# Patient Record
Sex: MALE | Race: WHITE | NOT HISPANIC OR LATINO | Employment: FULL TIME | URBAN - METROPOLITAN AREA
[De-identification: names, ages, dates, MRNs, and addresses within clinical notes are randomized per-mention and may not be internally consistent; named-entity substitution may affect disease eponyms.]

---

## 2019-10-21 ENCOUNTER — OFFICE VISIT (OUTPATIENT)
Dept: FAMILY MEDICINE CLINIC | Facility: CLINIC | Age: 39
End: 2019-10-21

## 2019-10-21 VITALS
OXYGEN SATURATION: 98 % | BODY MASS INDEX: 40.77 KG/M2 | HEART RATE: 97 BPM | SYSTOLIC BLOOD PRESSURE: 120 MMHG | DIASTOLIC BLOOD PRESSURE: 76 MMHG | TEMPERATURE: 98.1 F | HEIGHT: 65 IN | RESPIRATION RATE: 16 BRPM | WEIGHT: 244.7 LBS

## 2019-10-21 DIAGNOSIS — Z00.00 ANNUAL PHYSICAL EXAM: Primary | ICD-10-CM

## 2019-10-21 DIAGNOSIS — K57.92 DIVERTICULITIS: ICD-10-CM

## 2019-10-21 LAB
ALBUMIN SERPL-MCNC: 4.9 G/DL (ref 3.5–5.2)
ALBUMIN/GLOB SERPL: 2.3 G/DL
ALP SERPL-CCNC: 68 U/L (ref 39–117)
ALT SERPL-CCNC: 34 U/L (ref 1–41)
AST SERPL-CCNC: 16 U/L (ref 1–40)
BASOPHILS # BLD AUTO: 0.04 10*3/MM3 (ref 0–0.2)
BASOPHILS NFR BLD AUTO: 0.8 % (ref 0–1.5)
BILIRUB SERPL-MCNC: 0.4 MG/DL (ref 0.2–1.2)
BUN SERPL-MCNC: 8 MG/DL (ref 6–20)
BUN/CREAT SERPL: 8.2 (ref 7–25)
CALCIUM SERPL-MCNC: 9.9 MG/DL (ref 8.6–10.5)
CHLORIDE SERPL-SCNC: 100 MMOL/L (ref 98–107)
CHOLEST SERPL-MCNC: 188 MG/DL (ref 0–200)
CO2 SERPL-SCNC: 28.4 MMOL/L (ref 22–29)
CREAT SERPL-MCNC: 0.98 MG/DL (ref 0.76–1.27)
EOSINOPHIL # BLD AUTO: 0.1 10*3/MM3 (ref 0–0.4)
EOSINOPHIL NFR BLD AUTO: 2 % (ref 0.3–6.2)
ERYTHROCYTE [DISTWIDTH] IN BLOOD BY AUTOMATED COUNT: 12.4 % (ref 12.3–15.4)
GLOBULIN SER CALC-MCNC: 2.1 GM/DL
GLUCOSE SERPL-MCNC: 98 MG/DL (ref 65–99)
HCT VFR BLD AUTO: 46.1 % (ref 37.5–51)
HDLC SERPL-MCNC: 47 MG/DL (ref 40–60)
HGB BLD-MCNC: 15.9 G/DL (ref 13–17.7)
IMM GRANULOCYTES # BLD AUTO: 0.01 10*3/MM3 (ref 0–0.05)
IMM GRANULOCYTES NFR BLD AUTO: 0.2 % (ref 0–0.5)
LDLC SERPL CALC-MCNC: 117 MG/DL (ref 0–100)
LYMPHOCYTES # BLD AUTO: 1.34 10*3/MM3 (ref 0.7–3.1)
LYMPHOCYTES NFR BLD AUTO: 26.6 % (ref 19.6–45.3)
MCH RBC QN AUTO: 32.3 PG (ref 26.6–33)
MCHC RBC AUTO-ENTMCNC: 34.5 G/DL (ref 31.5–35.7)
MCV RBC AUTO: 93.7 FL (ref 79–97)
MONOCYTES # BLD AUTO: 0.42 10*3/MM3 (ref 0.1–0.9)
MONOCYTES NFR BLD AUTO: 8.3 % (ref 5–12)
NEUTROPHILS # BLD AUTO: 3.12 10*3/MM3 (ref 1.7–7)
NEUTROPHILS NFR BLD AUTO: 62.1 % (ref 42.7–76)
NRBC BLD AUTO-RTO: 0 /100 WBC (ref 0–0.2)
PLATELET # BLD AUTO: 259 10*3/MM3 (ref 140–450)
POTASSIUM SERPL-SCNC: 4.7 MMOL/L (ref 3.5–5.2)
PROT SERPL-MCNC: 7 G/DL (ref 6–8.5)
RBC # BLD AUTO: 4.92 10*6/MM3 (ref 4.14–5.8)
SODIUM SERPL-SCNC: 143 MMOL/L (ref 136–145)
TRIGL SERPL-MCNC: 120 MG/DL (ref 0–150)
VLDLC SERPL CALC-MCNC: 24 MG/DL
WBC # BLD AUTO: 5.03 10*3/MM3 (ref 3.4–10.8)

## 2019-10-21 PROCEDURE — 99203 OFFICE O/P NEW LOW 30 MIN: CPT | Performed by: FAMILY MEDICINE

## 2019-10-21 NOTE — PATIENT INSTRUCTIONS
Lab work ordered, will call with results  Complete antibiotics as directed  Recommend increasing fiber in diet as discussed  Return to clinic as needed and for routine annual exam

## 2019-10-21 NOTE — PROGRESS NOTES
Subjective   Roge Mustafa is a 39 y.o. male.     Chief Complaint   Patient presents with   • Abdominal Pain     follow up [facility] [place] abdominal [place]    • Establish Care   • Annual Exam     cpe       History of Present Illness   Patient presents for check up after diagnosis of diverticulitis Thursday (four days ago) when he went to Urgent care. He has been taking Augmentin and has improved greatly. This is his first episode. The LLQ pain was related to movement and coughing, worse when passing gas- first began Wednesday around 2pm. His BM's remained regular until yesterday when he reintroduced solid foods he had some diarrhea, described as small amount of loose stool and one episode this morning. The pain is currently 1-2/10, improved from 6-7/10 at its worse.   He has not been to the doctor in 10 years.  Denies fevers, unexpected weight loss, change in appetite, blood in stool. No FH of colon cancer or IBD.  He is  of a company, has some stress at work.  He admits to not eating many fruits or vegetables regularly. He eats a lot of cheese in his diet. Breakfast burrito for breakfast, pimento cheese sandwich for lunch, and whatever his wife cooks for dinner, in a typical day.    FH:  Father: HTN, A-fib  PGM: breast cancer    Never smoker.  Alcohol- 2 drinks/day, more on the weekends, at least 5 or 6 beers. Has not been drinking since the symptoms began.    The following portions of the patient's history were reviewed and updated as appropriate: allergies, current medications, past family history, past medical history, past social history, past surgical history and problem list.    Review of Systems   Constitutional: Negative for activity change, appetite change, chills, diaphoresis, fatigue, fever and unexpected weight loss.   Respiratory: Negative for cough, chest tightness, shortness of breath and wheezing.    Cardiovascular: Negative for chest pain, palpitations and leg swelling.   Gastrointestinal: Positive  "for abdominal distention and abdominal pain (Improved, mild). Negative for blood in stool, constipation, diarrhea, nausea, vomiting and GERD.   Genitourinary: Positive for frequency. Negative for dysuria and hematuria.   Musculoskeletal: Negative for arthralgias and myalgias.   Neurological: Negative for dizziness and headache.   Psychiatric/Behavioral: Negative for sleep disturbance and depressed mood. The patient is nervous/anxious (Chronic).        Objective   /76   Pulse 97   Temp 98.1 °F (36.7 °C) (Oral)   Resp 16   Ht 165.1 cm (65\")   Wt 111 kg (244 lb 11.2 oz)   SpO2 98%   BMI 40.72 kg/m²     Physical Exam   Constitutional: He appears well-developed and well-nourished. No distress.   HENT:   Head: Normocephalic.   Mouth/Throat: Oropharynx is clear and moist.   Eyes: Conjunctivae and EOM are normal. Pupils are equal, round, and reactive to light.   Neck: Normal range of motion. Neck supple.   Cardiovascular: Normal rate, regular rhythm, normal heart sounds and intact distal pulses.   No murmur heard.  Pulmonary/Chest: Effort normal and breath sounds normal. No respiratory distress. He has no wheezes. He has no rales.   Abdominal: Soft. Bowel sounds are normal. He exhibits no distension and no mass. There is tenderness (Mild tenderness of LLQ). There is no rebound and no guarding.   Musculoskeletal: Normal range of motion.   Lymphadenopathy:     He has no cervical adenopathy.   Neurological: He is alert.   Skin: Skin is warm and dry. Capillary refill takes less than 2 seconds.   Psychiatric: He has a normal mood and affect.   Vitals reviewed.        Assessment/Plan   Roge was seen today for abdominal pain, establish care and annual exam.    Diagnoses and all orders for this visit:    Annual physical exam  -     CBC & Differential; Future  -     Comprehensive Metabolic Panel; Future  -     Lipid Panel; Future  -     Lipid Panel  -     Comprehensive Metabolic Panel  -     CBC & " Differential    Diverticulitis            Patient Instructions   Lab work ordered, will call with results  Complete antibiotics as directed  Recommend increasing fiber in diet as discussed  Return to clinic as needed and for routine annual exam

## 2019-11-18 ENCOUNTER — OFFICE VISIT (OUTPATIENT)
Dept: GASTROENTEROLOGY | Facility: CLINIC | Age: 39
End: 2019-11-18

## 2019-11-18 VITALS
HEIGHT: 77 IN | WEIGHT: 238.2 LBS | DIASTOLIC BLOOD PRESSURE: 74 MMHG | TEMPERATURE: 97.8 F | SYSTOLIC BLOOD PRESSURE: 122 MMHG | BODY MASS INDEX: 28.13 KG/M2

## 2019-11-18 DIAGNOSIS — K57.92 DIVERTICULITIS: Primary | ICD-10-CM

## 2019-11-18 DIAGNOSIS — R10.32 LLQ PAIN: ICD-10-CM

## 2019-11-18 PROCEDURE — 99203 OFFICE O/P NEW LOW 30 MIN: CPT | Performed by: INTERNAL MEDICINE

## 2019-11-18 RX ORDER — SODIUM CHLORIDE, SODIUM LACTATE, POTASSIUM CHLORIDE, CALCIUM CHLORIDE 600; 310; 30; 20 MG/100ML; MG/100ML; MG/100ML; MG/100ML
30 INJECTION, SOLUTION INTRAVENOUS CONTINUOUS
Status: CANCELLED | OUTPATIENT
Start: 2019-12-05

## 2019-11-18 NOTE — PROGRESS NOTES
Chief Complaint   Patient presents with   • Diverticulitis     Subjective   HPI  Roge Mustafa is a 39 y.o. male who presents today for new patient evaluation.    Injury tells me that about 4 weeks ago he presented to local urgent care center with acute onset of focal left lower quadrant pain.  Pain was intense and rated 8 out of 10 maximal intensity.  Pain did not radiate.  Pain was made worse by certain movements and coughing and bending.  He was diagnosed with diverticulitis at the urgent care center given a prescription for Augmentin.  Within 3 days of starting the medication he had complete resolution of his symptoms.  He is currently pain-free.  He did not have cross-sectional imaging performed at the time of his diagnosis.  He did have a white blood cell count performed about a week later at his PCP which was within normal limits.  He reports no associated change in bowel habit.  He had no  hematochezia or melena.    Reports his mother had polyps at the age of 50.  There is no family history of diverticulitis to his knowledge.  Since his diagnosis he has decreased his alcohol intake and cut out saturated fats and is lost about 13 pounds intentionally.      Past Medical History:   Diagnosis Date   • Diverticulitis    • Diverticulitis of colon 10/16/2019   • GERD (gastroesophageal reflux disease) 1992   • Lactose intolerance 2004     No current outpatient medications on file.  No Known Allergies  Social History     Socioeconomic History   • Marital status:      Spouse name: Not on file   • Number of children: Not on file   • Years of education: Not on file   • Highest education level: Not on file   Tobacco Use   • Smoking status: Never Smoker   • Smokeless tobacco: Never Used   Substance and Sexual Activity   • Alcohol use: Yes   • Drug use: No   • Sexual activity: Yes     Partners: Female     Birth control/protection: None     Family History   Problem Relation Age of Onset   • Stroke Maternal Grandmother     • Diverticulitis Maternal Grandmother    • Cancer Paternal Grandmother    • Stomach cancer Paternal Grandmother    • Colon polyps Mother    • Irritable bowel syndrome Mother      Review of Systems   Constitutional: Negative for fever.   Gastrointestinal: Positive for abdominal pain and diarrhea. Negative for constipation, nausea and vomiting.   Genitourinary: Negative for dysuria, frequency and hematuria.   Musculoskeletal: Negative for arthralgias and myalgias.   Neurological: Negative for headaches.   All other systems reviewed and are negative.    Objective   Vitals:    11/18/19 1403   BP: 122/74   Temp: 97.8 °F (36.6 °C)     Physical Exam   Constitutional: He is oriented to person, place, and time. He appears well-developed and well-nourished.   HENT:   Head: Normocephalic and atraumatic.   Abdominal: Soft. Bowel sounds are normal. He exhibits no distension and no mass. There is no tenderness. No hernia.   Neurological: He is alert and oriented to person, place, and time.   Skin: Skin is warm and dry.   Psychiatric: He has a normal mood and affect. His behavior is normal. Judgment and thought content normal.   Vitals reviewed.    Assessment/Plan   Assessment:     1. Diverticulitis    2. LLQ pain      Plan:   Patient with what is suspected to be a first episode of acute uncomplicated diverticulitis treated with outpatient antibiotics.  My typical recommendation is that 6 to 8 weeks after this index episode we perform colonoscopy to assess the severity of his diverticulosis and rule out any additional pathology.  I discussed colonoscopy procedure today in detail with Roge.  He is agreeable with proceeding.  We will schedule this in the next 3 to 4 weeks.        Mic Omalley M.D.  [facility]  [address] [place] [address]  Office: [phone]

## 2019-11-25 ENCOUNTER — TELEPHONE (OUTPATIENT)
Dept: GASTROENTEROLOGY | Facility: CLINIC | Age: 39
End: 2019-11-25

## 2019-11-25 DIAGNOSIS — R10.32 LLQ ABDOMINAL PAIN: Primary | ICD-10-CM

## 2019-11-25 DIAGNOSIS — Z87.19 HISTORY OF DIVERTICULITIS: ICD-10-CM

## 2019-11-25 NOTE — TELEPHONE ENCOUNTER
Called pt and advised of the note from Dr Omalley. Advised will place the order and he can call central scheduling at [phone] to arrange the scan. Pt verb understanding.

## 2019-11-25 NOTE — TELEPHONE ENCOUNTER
Called pt back. He states he was seen in October by urgent care and was treated for suspected diverticulitis. He did well for quite some time and saw Dr ROY on 11/18 and was set up for a scope on 12/5. Over the weekend, he developed persistent pain in the LLQ again. This time, it is less than it was with the tics flare: back then the pain was 7 or 8 out of 10 and this this time it is a 1.5 or 2 put of 10. He states the pain is persistent though and never really goes away. He states he does feel bloated and gassy but has not had any change to his bowel habits. Pt denies: fever, chills, nausea, and vomiting. He states in October, the pain came on very quickly and was severe immediate. He took a few Tylenol yesterday and it did help a little. He wanted to call and update; he did not know if this was worrisome or not. Advised will update MD and call back. Pt verb understanding.

## 2019-11-25 NOTE — TELEPHONE ENCOUNTER
I think we should consider getting CT scan with IV/oral contrast to evaluate given this was not done at his initial episode.

## 2019-11-25 NOTE — TELEPHONE ENCOUNTER
----- Message from Sherrill Mckenzie Rep sent at 11/25/2019  9:04 AM EST -----  Regarding: wants to talk to nurse  Contact: [phone]  Pt is wanting to talk to nurse.

## 2019-11-29 ENCOUNTER — APPOINTMENT (OUTPATIENT)
Dept: CT IMAGING | Facility: HOSPITAL | Age: 39
End: 2019-11-29

## 2019-12-05 ENCOUNTER — HOSPITAL ENCOUNTER (OUTPATIENT)
Facility: HOSPITAL | Age: 39
Setting detail: HOSPITAL OUTPATIENT SURGERY
Discharge: HOME | End: 2019-12-05
Attending: INTERNAL MEDICINE | Admitting: INTERNAL MEDICINE

## 2019-12-05 ENCOUNTER — ANESTHESIA EVENT (OUTPATIENT)
Dept: GASTROENTEROLOGY | Facility: HOSPITAL | Age: 39
End: 2019-12-05

## 2019-12-05 ENCOUNTER — ANESTHESIA (OUTPATIENT)
Dept: GASTROENTEROLOGY | Facility: HOSPITAL | Age: 39
End: 2019-12-05

## 2019-12-05 VITALS
HEART RATE: 71 BPM | OXYGEN SATURATION: 96 % | RESPIRATION RATE: 14 BRPM | DIASTOLIC BLOOD PRESSURE: 88 MMHG | TEMPERATURE: 98.4 F | WEIGHT: 227.9 LBS | HEIGHT: 77 IN | SYSTOLIC BLOOD PRESSURE: 119 MMHG | BODY MASS INDEX: 26.91 KG/M2

## 2019-12-05 DIAGNOSIS — R10.32 LLQ PAIN: ICD-10-CM

## 2019-12-05 DIAGNOSIS — K57.92 DIVERTICULITIS: ICD-10-CM

## 2019-12-05 PROCEDURE — 45385 COLONOSCOPY W/LESION REMOVAL: CPT | Performed by: INTERNAL MEDICINE

## 2019-12-05 PROCEDURE — 88305 TISSUE EXAM BY PATHOLOGIST: CPT | Performed by: INTERNAL MEDICINE

## 2019-12-05 PROCEDURE — 25010000002 PROPOFOL 10 MG/ML EMULSION: Performed by: ANESTHESIOLOGY

## 2019-12-05 RX ORDER — PROPOFOL 10 MG/ML
VIAL (ML) INTRAVENOUS AS NEEDED
Status: DISCONTINUED | OUTPATIENT
Start: 2019-12-05 | End: 2019-12-05 | Stop reason: SURG

## 2019-12-05 RX ORDER — PROPOFOL 10 MG/ML
VIAL (ML) INTRAVENOUS CONTINUOUS PRN
Status: DISCONTINUED | OUTPATIENT
Start: 2019-12-05 | End: 2019-12-05 | Stop reason: SURG

## 2019-12-05 RX ORDER — ACETAMINOPHEN 500 MG
500 TABLET ORAL EVERY 6 HOURS PRN
COMMUNITY
End: 2022-08-18

## 2019-12-05 RX ORDER — LIDOCAINE HYDROCHLORIDE 20 MG/ML
INJECTION, SOLUTION INFILTRATION; PERINEURAL AS NEEDED
Status: DISCONTINUED | OUTPATIENT
Start: 2019-12-05 | End: 2019-12-05 | Stop reason: SURG

## 2019-12-05 RX ORDER — SODIUM CHLORIDE, SODIUM LACTATE, POTASSIUM CHLORIDE, CALCIUM CHLORIDE 600; 310; 30; 20 MG/100ML; MG/100ML; MG/100ML; MG/100ML
30 INJECTION, SOLUTION INTRAVENOUS CONTINUOUS
Status: DISCONTINUED | OUTPATIENT
Start: 2019-12-05 | End: 2019-12-05 | Stop reason: HOSPADM

## 2019-12-05 RX ADMIN — LIDOCAINE HYDROCHLORIDE 60 MG: 20 INJECTION, SOLUTION INFILTRATION; PERINEURAL at 12:32

## 2019-12-05 RX ADMIN — SODIUM CHLORIDE, POTASSIUM CHLORIDE, SODIUM LACTATE AND CALCIUM CHLORIDE 30 ML/HR: 600; 310; 30; 20 INJECTION, SOLUTION INTRAVENOUS at 12:00

## 2019-12-05 RX ADMIN — PROPOFOL 200 MCG/KG/MIN: 10 INJECTION, EMULSION INTRAVENOUS at 12:33

## 2019-12-05 RX ADMIN — PROPOFOL 120 MG: 10 INJECTION, EMULSION INTRAVENOUS at 12:32

## 2019-12-05 NOTE — ANESTHESIA POSTPROCEDURE EVALUATION
"Patient: Roge Mustafa    Procedure Summary     Date:  12/05/19 Room / Location:  [facility] ENDOSCOPY 10 /  [facility]    Anesthesia Start:  1228 Anesthesia Stop:  1249    Procedure:  COLONOSCOPY TO CECUM WITH COLD SNARE POLYPECTOMIES (N/A ) Diagnosis:       Diverticulitis      LLQ pain      (Diverticulitis [K57.92])      (LLQ pain [R10.32])    Surgeon:  Mic Omalley MD Provider:  Juanjose Vasquez MD    Anesthesia Type:  MAC ASA Status:  2          Anesthesia Type: MAC  Last vitals  BP   115/83 (12/05/19 1303)   Temp   36.9 °C (98.4 °F) (12/05/19 1146)   Pulse   78 (12/05/19 1303)   Resp   14 (12/05/19 1146)     SpO2   96 % (12/05/19 1303)     Post Anesthesia Care and Evaluation    Patient location during evaluation: bedside  Patient participation: complete - patient participated  Level of consciousness: awake and alert  Pain management: adequate  Airway patency: patent  Anesthetic complications: No anesthetic complications  PONV Status: none  Cardiovascular status: acceptable  Respiratory status: acceptable  Hydration status: acceptable    Comments: /83 (BP Location: Left arm, Patient Position: Lying)   Pulse 78   Temp 36.9 °C (98.4 °F) (Oral)   Resp 14   Ht 195.6 cm (77\")   Wt 103 kg (227 lb 14.4 oz)   SpO2 96%   BMI 27.02 kg/m²         "

## 2019-12-05 NOTE — ANESTHESIA PREPROCEDURE EVALUATION
Anesthesia Evaluation     Patient summary reviewed and Nursing notes reviewed   no history of anesthetic complications:  NPO Solid Status: > 8 hours  NPO Liquid Status: > 8 hours           Airway   Mallampati: II  TM distance: >3 FB  Neck ROM: full  No difficulty expected  Dental - normal exam     Pulmonary    (-) rhonchi, decreased breath sounds, wheezes, not a smoker  Cardiovascular   Exercise tolerance: good (4-7 METS)    Rhythm: regular  Rate: normal    (-) hypertension, CAD, angina, HARMON, murmur      Neuro/Psych  (-) CVA  GI/Hepatic/Renal/Endo    (+)  GERD,    (-) no renal disease, diabetes    Musculoskeletal     Abdominal     Abdomen: soft.   Substance History      OB/GYN          Other                        Anesthesia Plan    ASA 2     MAC   total IV anesthesia  intravenous induction     Anesthetic plan, all risks, benefits, and alternatives have been provided, discussed and informed consent has been obtained with: patient.

## 2019-12-06 LAB
CYTO UR: NORMAL
LAB AP CASE REPORT: NORMAL
PATH REPORT.FINAL DX SPEC: NORMAL
PATH REPORT.GROSS SPEC: NORMAL

## 2020-01-02 ENCOUNTER — TELEPHONE (OUTPATIENT)
Dept: GASTROENTEROLOGY | Facility: CLINIC | Age: 40
End: 2020-01-02

## 2020-01-02 NOTE — TELEPHONE ENCOUNTER
Repeat c/s in 5 yrs added to  12/5/24.  Patient called, advised as per Dr. Omalley's note. He verb understanding.

## 2020-01-02 NOTE — TELEPHONE ENCOUNTER
----- Message from Mic Omalley MD sent at 12/30/2019  6:56 AM EST -----  The polyp(s) biopsies showed adenomatous change. This is not cancerous but is considered potentially precancerous. Follow-up colonoscopy in 5 years is advised.

## 2021-02-03 ENCOUNTER — IMMUNIZATION (OUTPATIENT)
Dept: VACCINE CLINIC | Facility: HOSPITAL | Age: 41
End: 2021-02-03

## 2021-02-03 PROCEDURE — 0001A: CPT | Performed by: INTERNAL MEDICINE

## 2021-02-03 PROCEDURE — 91300 HC SARSCOV02 VAC 30MCG/0.3ML IM: CPT | Performed by: INTERNAL MEDICINE

## 2021-02-24 ENCOUNTER — IMMUNIZATION (OUTPATIENT)
Dept: VACCINE CLINIC | Facility: HOSPITAL | Age: 41
End: 2021-02-24

## 2021-02-24 PROCEDURE — 91300 HC SARSCOV02 VAC 30MCG/0.3ML IM: CPT | Performed by: INTERNAL MEDICINE

## 2021-02-24 PROCEDURE — 0002A: CPT | Performed by: INTERNAL MEDICINE

## 2021-10-12 ENCOUNTER — IMMUNIZATION (OUTPATIENT)
Dept: VACCINE CLINIC | Facility: HOSPITAL | Age: 41
End: 2021-10-12

## 2021-10-12 PROCEDURE — 0004A ADM SARSCOV2 30MCG/0.3ML BOOSTER: CPT | Performed by: INTERNAL MEDICINE

## 2021-10-12 PROCEDURE — 0003A: CPT | Performed by: INTERNAL MEDICINE

## 2021-10-12 PROCEDURE — 91300 HC SARSCOV02 VAC 30MCG/0.3ML IM: CPT | Performed by: INTERNAL MEDICINE

## 2022-07-15 ENCOUNTER — OFFICE VISIT (OUTPATIENT)
Dept: GASTROENTEROLOGY | Facility: CLINIC | Age: 42
End: 2022-07-15

## 2022-07-15 VITALS
SYSTOLIC BLOOD PRESSURE: 142 MMHG | DIASTOLIC BLOOD PRESSURE: 96 MMHG | BODY MASS INDEX: 29.32 KG/M2 | WEIGHT: 248.3 LBS | HEIGHT: 77 IN | TEMPERATURE: 97.5 F

## 2022-07-15 DIAGNOSIS — K21.9 GASTROESOPHAGEAL REFLUX DISEASE, UNSPECIFIED WHETHER ESOPHAGITIS PRESENT: Primary | ICD-10-CM

## 2022-07-15 PROCEDURE — 99214 OFFICE O/P EST MOD 30 MIN: CPT | Performed by: INTERNAL MEDICINE

## 2022-07-15 RX ORDER — OMEPRAZOLE 20 MG/1
20 CAPSULE, DELAYED RELEASE ORAL DAILY
COMMUNITY

## 2022-07-15 NOTE — PROGRESS NOTES
Chief Complaint   Patient presents with   • Heartburn   • Difficulty Swallowing     Subjective     HPI  Roge Mustafa is a 41 y.o. male who presents today for office follow up.  Seen previously for history of diverticulitis and had a colonoscopy performed in 2020 with small tubular adenoma with recall 5 years recommended.    Today is here for evaluation of longstanding GERD.  Reports has had heartburn-like symptoms for 20 to 25 years.  He gets an occasional sensation of food being slow to pass through his chest and has rarely had to vomit to relieve the sensation.  He does note that when he takes his over-the-counter omeprazole this tends to mitigate his symptoms but he has not been in the habit of taking this regularly.  He has never had prior EGD.  He does have father and grandfather who have both had hiatal hernias and similar symptoms.      Objective   Vitals:    07/15/22 1114   BP: 142/96   Temp: 97.5 °F (36.4 °C)       Physical Exam  Vitals reviewed.   Constitutional:       Appearance: He is well-developed.   HENT:      Head: Normocephalic and atraumatic.   Neurological:      Mental Status: He is alert and oriented to person, place, and time.   Psychiatric:         Behavior: Behavior normal.         Thought Content: Thought content normal.         Judgment: Judgment normal.                Assessment & Plan   Assessment:     1. Gastroesophageal reflux disease, unspecified whether esophagitis present    2.      Dysphagia    Plan:   Patient will be scheduled for EGD to evaluate his longstanding GERD and intermittent dysphagia  Recommend regular use of PPI as this seems to adequately treat his symptoms when he takes it.  If he is having breakthrough symptoms on omeprazole we can consider dose escalation or change to a different PPI.          Mic Omalley M.D.  [facility]  [address] [place] [address]  Office: [phone]

## 2022-07-25 ENCOUNTER — TELEPHONE (OUTPATIENT)
Dept: GASTROENTEROLOGY | Facility: CLINIC | Age: 42
End: 2022-07-25

## 2022-08-11 ENCOUNTER — TELEPHONE (OUTPATIENT)
Dept: GASTROENTEROLOGY | Facility: CLINIC | Age: 42
End: 2022-08-11

## 2022-08-11 NOTE — TELEPHONE ENCOUNTER
Caller: Roge Mustafa    Relationship to patient: Self    Best call back number: [phone]    Type of visit: SCOPE    Requested date: FIRST AVAILABLE     Additional notes: PLEASE CALL TO SCHEDULE. MISSED CALL FROM [facility] ON 07.25.22

## 2022-08-18 ENCOUNTER — OFFICE VISIT (OUTPATIENT)
Dept: FAMILY MEDICINE CLINIC | Facility: CLINIC | Age: 42
End: 2022-08-18

## 2022-08-18 VITALS
OXYGEN SATURATION: 98 % | WEIGHT: 248 LBS | BODY MASS INDEX: 29.28 KG/M2 | DIASTOLIC BLOOD PRESSURE: 88 MMHG | SYSTOLIC BLOOD PRESSURE: 132 MMHG | HEIGHT: 77 IN | HEART RATE: 85 BPM

## 2022-08-18 DIAGNOSIS — Z12.5 PROSTATE CANCER SCREENING: ICD-10-CM

## 2022-08-18 DIAGNOSIS — K21.9 GERD WITHOUT ESOPHAGITIS: ICD-10-CM

## 2022-08-18 DIAGNOSIS — K57.30 DIVERTICULOSIS OF COLON: ICD-10-CM

## 2022-08-18 DIAGNOSIS — Z13.1 DIABETES MELLITUS SCREENING: ICD-10-CM

## 2022-08-18 DIAGNOSIS — Z00.00 GENERAL MEDICAL EXAM: Primary | ICD-10-CM

## 2022-08-18 DIAGNOSIS — Z86.010 PERSONAL HISTORY OF COLONIC POLYPS: ICD-10-CM

## 2022-08-18 PROBLEM — K57.92 DIVERTICULITIS: Status: ACTIVE | Noted: 2019-10-16

## 2022-08-18 PROCEDURE — 99396 PREV VISIT EST AGE 40-64: CPT | Performed by: FAMILY MEDICINE

## 2022-08-18 NOTE — ASSESSMENT & PLAN NOTE
Due for repeat in December 2024 given history of tubular adenoma on his December 2019 colonoscopy.

## 2022-08-18 NOTE — PROGRESS NOTES
"Chief Complaint  Annual Exam    Subjective    History of Present Illness:  Roge Mustafa is a 41 y.o. male who presents today for physical exam.    Has been several years since he was last seen here-over 3 years.  He will get fasting blood work up-to-date and would like screening PSA done with labs.    History of diverticulitis in the past and did have colonoscopy in December 2019 for follow-up after acute diverticulitis.  He did have 1 polyp removed that returned tubular adenoma without dysplasia.  He is due for repeat colonoscopy in 5 years (December 2024).    He is on fiber daily to try to help prevent diverticulitis flareups.    Tdap up-to-date with the birth of his child in January 2018    He does monitor blood pressure at home consistently and most readings are under 140/90.  He has a few that are over 140/90 but those are outliers.  He does notice on his apple watch sometimes his oxygen is in the low 90s and he does snore.  We discussed sleep apnea work-up and he does not have nonrestorative sleep, daytime fatigue, or morning headaches.  He would like to wait on sleep apnea work-up at this time but understands he can let us know if interested to get this scheduled.    He did have a dermatology checkup and there is a mole along his left lower abdomen that he had evaluated.  Dermatology did not feel it needed excision at this time but he does feel it has been present for less than 2 years.  He is considering a second opinion to have this area removed given concern it could develop into a problem.    History of heavy alcohol use in his teenage and 20s.  No significant alcohol use at this time.  Does have problems with GERD and is set up to get an EGD done.  No dysphagia.    Objective   Vital Signs:   /88   Pulse 85   Ht 195.6 cm (77\")   Wt 112 kg (248 lb)   SpO2 98%   BMI 29.41 kg/m²     Review of Systems   Constitutional: Negative for appetite change, chills and fever.   HENT: Negative for hearing " loss.    Eyes: Negative for blurred vision.   Respiratory: Negative for chest tightness.    Cardiovascular: Negative for chest pain.   Gastrointestinal: Positive for GERD. Negative for abdominal pain.   Musculoskeletal: Negative for gait problem.   Skin: Positive for skin lesions. Negative for rash.   Psychiatric/Behavioral: Negative for depressed mood.       Past History:  Medical History: has a past medical history of Diverticulitis, Diverticulitis of colon (10/16/2019), GERD (gastroesophageal reflux disease) (1992), and Lactose intolerance (2004).   Surgical History: has a past surgical history that includes Colonoscopy (N/A, 12/05/2019).   Family History: family history includes Alcohol abuse in his father and maternal grandfather; Cancer in his maternal grandmother and paternal grandmother; Colon polyps in his mother; Diverticulitis in his maternal grandmother; Irritable bowel syndrome in his mother; Stomach cancer in his paternal grandmother; Stroke in his maternal grandmother.   Social History: reports that he has never smoked. He has never used smokeless tobacco. He reports current alcohol use. He reports that he does not use drugs.      Current Outpatient Medications:   •  Multiple Vitamins-Minerals (MENS MULTIVITAMIN PO), Take  by mouth., Disp: , Rfl:   •  omeprazole (priLOSEC) 20 MG capsule, Take 20 mg by mouth Daily., Disp: , Rfl:     Allergies: Patient has no known allergies.    Physical Exam  Constitutional:       Appearance: Normal appearance.   HENT:      Head: Normocephalic.      Right Ear: External ear normal.      Left Ear: External ear normal.      Nose: Nose normal.   Eyes:      Pupils: Pupils are equal, round, and reactive to light.   Cardiovascular:      Rate and Rhythm: Normal rate and regular rhythm.      Heart sounds: Normal heart sounds.   Pulmonary:      Effort: Pulmonary effort is normal.      Breath sounds: Normal breath sounds.   Musculoskeletal:         General: Normal range of  motion.      Cervical back: Normal range of motion.   Skin:     General: Skin is warm and dry.      Comments: Focused exam.  Small dark brown nevus approximately 3 mm with no concerning features.  Exam suggestive for atypical nevus.  Discussed if he is concerned given this has been present less than 2 years he can get a second opinion and ask for excision.  He is considering a second opinion with [facility]   Neurological:      General: No focal deficit present.      Mental Status: He is alert.   Psychiatric:         Mood and Affect: Mood normal.         Behavior: Behavior normal.         Thought Content: Thought content normal.          Result Review                   Assessment and Plan  Diagnoses and all orders for this visit:    1. General medical exam (Primary)  Assessment & Plan:  Reviewed health maintenance, screening, and vaccinations.    He will return to get fasting blood work up-to-date.    Reviewed his colon polyp history and discussed he is due for repeat in 2024.    Discussed blood pressure readings and he will continue to monitor blood pressure at home.    Discussed snoring and lower oxygen levels on his apple watch along with mild blood pressure elevation.  He would like to wait on sleep apnea work-up at this time will let us know if he is interested.    Plan to return for a physical in 1 year or as needed    Orders:  -     CBC Auto Differential; Future  -     Comprehensive Metabolic Panel; Future  -     Lipid Panel; Future  -     TSH; Future  -     T4, Free; Future    2. Diabetes mellitus screening  -     Hemoglobin A1c; Future    3. Prostate cancer screening  -     PSA Screen; Future    4. Personal history of colonic polyps  Assessment & Plan:  Due for repeat in December 2024 given history of tubular adenoma on his December 2019 colonoscopy.      5. GERD without esophagitis  Assessment & Plan:  On omeprazole.    Has appointment for EGD with GI.      6. Diverticulosis of  colon  Assessment & Plan:  Discussed today and encouraged him to add fiber daily to his diet.        BMI is >= 25 and <30. (Overweight) The following options were offered after discussion;: exercise counseling/recommendations and nutrition counseling/recommendations          Follow Up  Return in about 1 year (around 8/18/2023) for Annual physical.    Bakari Lam MD

## 2022-08-18 NOTE — ASSESSMENT & PLAN NOTE
Reviewed health maintenance, screening, and vaccinations.    He will return to get fasting blood work up-to-date.    Reviewed his colon polyp history and discussed he is due for repeat in 2024.    Discussed blood pressure readings and he will continue to monitor blood pressure at home.    Discussed snoring and lower oxygen levels on his apple watch along with mild blood pressure elevation.  He would like to wait on sleep apnea work-up at this time will let us know if he is interested.    Plan to return for a physical in 1 year or as needed

## 2022-11-07 ENCOUNTER — ANESTHESIA (OUTPATIENT)
Dept: GASTROENTEROLOGY | Facility: HOSPITAL | Age: 42
End: 2022-11-07

## 2022-11-07 ENCOUNTER — ANESTHESIA EVENT (OUTPATIENT)
Dept: GASTROENTEROLOGY | Facility: HOSPITAL | Age: 42
End: 2022-11-07

## 2022-11-07 ENCOUNTER — HOSPITAL ENCOUNTER (OUTPATIENT)
Facility: HOSPITAL | Age: 42
Setting detail: HOSPITAL OUTPATIENT SURGERY
Discharge: HOME | End: 2022-11-07
Attending: INTERNAL MEDICINE | Admitting: INTERNAL MEDICINE

## 2022-11-07 VITALS
HEIGHT: 77 IN | BODY MASS INDEX: 29.87 KG/M2 | SYSTOLIC BLOOD PRESSURE: 118 MMHG | RESPIRATION RATE: 16 BRPM | HEART RATE: 82 BPM | DIASTOLIC BLOOD PRESSURE: 91 MMHG | TEMPERATURE: 98.1 F | WEIGHT: 253 LBS | OXYGEN SATURATION: 95 %

## 2022-11-07 DIAGNOSIS — K21.9 GASTROESOPHAGEAL REFLUX DISEASE, UNSPECIFIED WHETHER ESOPHAGITIS PRESENT: ICD-10-CM

## 2022-11-07 PROCEDURE — 25010000002 PROPOFOL 10 MG/ML EMULSION: Performed by: ANESTHESIOLOGY

## 2022-11-07 PROCEDURE — 0 LIDOCAINE 1 % SOLUTION: Performed by: ANESTHESIOLOGY

## 2022-11-07 PROCEDURE — 88305 TISSUE EXAM BY PATHOLOGIST: CPT | Performed by: INTERNAL MEDICINE

## 2022-11-07 PROCEDURE — 43239 EGD BIOPSY SINGLE/MULTIPLE: CPT | Performed by: INTERNAL MEDICINE

## 2022-11-07 RX ORDER — LIDOCAINE HYDROCHLORIDE 10 MG/ML
INJECTION, SOLUTION INFILTRATION; PERINEURAL AS NEEDED
Status: DISCONTINUED | OUTPATIENT
Start: 2022-11-07 | End: 2022-11-07 | Stop reason: SURG

## 2022-11-07 RX ORDER — PROPOFOL 10 MG/ML
VIAL (ML) INTRAVENOUS AS NEEDED
Status: DISCONTINUED | OUTPATIENT
Start: 2022-11-07 | End: 2022-11-07 | Stop reason: SURG

## 2022-11-07 RX ORDER — SODIUM CHLORIDE, SODIUM LACTATE, POTASSIUM CHLORIDE, CALCIUM CHLORIDE 600; 310; 30; 20 MG/100ML; MG/100ML; MG/100ML; MG/100ML
INJECTION, SOLUTION INTRAVENOUS CONTINUOUS PRN
Status: DISCONTINUED | OUTPATIENT
Start: 2022-11-07 | End: 2022-11-07 | Stop reason: SURG

## 2022-11-07 RX ORDER — SODIUM CHLORIDE, SODIUM LACTATE, POTASSIUM CHLORIDE, CALCIUM CHLORIDE 600; 310; 30; 20 MG/100ML; MG/100ML; MG/100ML; MG/100ML
30 INJECTION, SOLUTION INTRAVENOUS CONTINUOUS PRN
Status: DISCONTINUED | OUTPATIENT
Start: 2022-11-07 | End: 2022-11-07 | Stop reason: HOSPADM

## 2022-11-07 RX ADMIN — SODIUM CHLORIDE, POTASSIUM CHLORIDE, SODIUM LACTATE AND CALCIUM CHLORIDE 30 ML/HR: 600; 310; 30; 20 INJECTION, SOLUTION INTRAVENOUS at 10:20

## 2022-11-07 RX ADMIN — PROPOFOL 150 MG: 10 INJECTION, EMULSION INTRAVENOUS at 10:35

## 2022-11-07 RX ADMIN — SODIUM CHLORIDE, POTASSIUM CHLORIDE, SODIUM LACTATE AND CALCIUM CHLORIDE: 600; 310; 30; 20 INJECTION, SOLUTION INTRAVENOUS at 10:33

## 2022-11-07 RX ADMIN — LIDOCAINE HYDROCHLORIDE 50 MG: 10 INJECTION, SOLUTION INFILTRATION; PERINEURAL at 10:35

## 2022-11-07 RX ADMIN — PROPOFOL 100 MG: 10 INJECTION, EMULSION INTRAVENOUS at 10:37

## 2022-11-07 RX ADMIN — PROPOFOL 100 MG: 10 INJECTION, EMULSION INTRAVENOUS at 10:42

## 2022-11-07 RX ADMIN — PROPOFOL 100 MG: 10 INJECTION, EMULSION INTRAVENOUS at 10:39

## 2022-11-07 RX ADMIN — PROPOFOL 200 MCG/KG/MIN: 10 INJECTION, EMULSION INTRAVENOUS at 10:35

## 2022-11-07 NOTE — ANESTHESIA PREPROCEDURE EVALUATION
Anesthesia Evaluation     Patient summary reviewed   NPO Solid Status: > 6 hours  NPO Liquid Status: > 2 hours           Airway   Mallampati: II  TM distance: >3 FB  Neck ROM: full  Dental - normal exam     Pulmonary    (-) COPD, asthma, sleep apnea, not a smoker  Cardiovascular     (-) valvular problems/murmurs, CAD, dysrhythmias, angina      Neuro/Psych  (-) seizures, CVA  GI/Hepatic/Renal/Endo    (+)  GERD,    (-) liver disease, no renal disease, diabetes, no thyroid disorder    Musculoskeletal     Abdominal    Substance History      OB/GYN          Other                        Anesthesia Plan    ASA 2     MAC       Anesthetic plan, risks, benefits, and alternatives have been provided, discussed and informed consent has been obtained with: patient.        CODE STATUS:

## 2022-11-07 NOTE — H&P
[facility]  Pre Procedure History & Physical    Chief Complaint:   GERD    Subjective     HPI:   42 y.o. male here for EGD for evaluation of long standing GERD    Past Medical History:   Past Medical History:   Diagnosis Date   • Diverticulitis    • Diverticulitis of colon 10/16/2019   • GERD (gastroesophageal reflux disease) 1992   • Lactose intolerance 2004       Past Surgical History:  Past Surgical History:   Procedure Laterality Date   • COLONOSCOPY N/A 12/05/2019    Procedure: COLONOSCOPY TO CECUM WITH COLD SNARE POLYPECTOMIES;  Surgeon: Mic Omalley MD;  Location: [facility];  Service: Gastroenterology       Family History:  Family History   Problem Relation Age of Onset   • Colon polyps Mother    • Irritable bowel syndrome Mother    • Alcohol abuse Father    • Stroke Maternal Grandmother    • Diverticulitis Maternal Grandmother    • Cancer Maternal Grandmother    • Alcohol abuse Maternal Grandfather    • Cancer Paternal Grandmother    • Stomach cancer Paternal Grandmother        Social History:   reports that he has never smoked. He has never used smokeless tobacco. He reports current alcohol use. He reports that he does not use drugs.    Medications:   Medications Prior to Admission   Medication Sig Dispense Refill Last Dose   • azithromycin (Zithromax Z-Chavo) 250 MG tablet Take 2 tablets the first day, then 1 tablet daily for 4 days. 6 tablet 0    • omeprazole (priLOSEC) 20 MG capsule Take 20 mg by mouth Daily.      • promethazine-codeine (PHENERGAN with CODEINE) 6.25-10 MG/5ML syrup Take 5-10 ml QHS prn for cough 118 mL 0        Allergies:  Patient has no known allergies.    ROS:    Pertinent items are noted in HPI     Objective     There were no vitals taken for this visit.    Physical Exam   Constitutional: Pt is oriented to person, place, and time and well-developed, well-nourished, and in no distress.   Mouth/Throat: Oropharynx is clear and moist.   Neck: Normal  range of motion.   Cardiovascular: Normal rate, regular rhythm and normal heart sounds.    Pulmonary/Chest: Effort normal and breath sounds normal.   Abdominal: Soft. Nontender  Skin: Skin is warm and dry.   Psychiatric: Mood, memory, affect and judgment normal.     Assessment & Plan     Diagnosis:  GERD    Anticipated Surgical Procedure:  EGD    The risks, benefits, and alternatives of this procedure have been discussed with the patient or the responsible party- the patient understands and agrees to proceed.

## 2022-11-07 NOTE — DISCHARGE INSTRUCTIONS

## 2022-11-07 NOTE — ANESTHESIA POSTPROCEDURE EVALUATION
Patient: Roge Mustafa    Procedure Summary     Date: 11/07/22 Room / Location:  [facility] /  [facility]    Anesthesia Start: 1033 Anesthesia Stop: 1052    Procedure: ESOPHAGOGASTRODUODENOSCOPY WITH COLD BIOPSIES (Esophagus) Diagnosis:       Gastroesophageal reflux disease, unspecified whether esophagitis present      (Gastroesophageal reflux disease, unspecified whether esophagitis present [K21.9])    Surgeons: Mic Omalley MD Provider: Willis Felton MD    Anesthesia Type: MAC ASA Status: 2          Anesthesia Type: MAC    Vitals  Vitals Value Taken Time   /91 11/07/22 1107   Temp     Pulse 82 11/07/22 1107   Resp 16 11/07/22 1107   SpO2 95 % 11/07/22 1107           Post Anesthesia Care and Evaluation    Level of consciousness: awake  Pain management: satisfactory to patient    Airway patency: patent  Anesthetic complications: No anesthetic complications  PONV Status: controlled  Cardiovascular status: acceptable  Respiratory status: acceptable  Hydration status: acceptable

## 2022-11-08 LAB
LAB AP CASE REPORT: NORMAL
LAB AP DIAGNOSIS COMMENT: NORMAL
PATH REPORT.FINAL DX SPEC: NORMAL
PATH REPORT.GROSS SPEC: NORMAL

## 2022-11-17 NOTE — PROGRESS NOTES
Some reflux related irritation of esophagus on biopsies, no Barretts  Continue PPI  Office f/u in 3 mos

## 2022-12-15 ENCOUNTER — TELEPHONE (OUTPATIENT)
Dept: GASTROENTEROLOGY | Facility: CLINIC | Age: 42
End: 2022-12-15

## 2022-12-15 NOTE — TELEPHONE ENCOUNTER
----- Message from Mic Omalley MD sent at 11/17/2022  2:49 PM EST -----  Some reflux related irritation of esophagus on biopsies, no Barretts  Continue PPI  Office f/u in 3 mos

## 2022-12-15 NOTE — TELEPHONE ENCOUNTER
Patient called. Advised as per Dr. Omalley's note. He verb understanding.   Declined f/u visit at this time.

## 2024-08-15 ENCOUNTER — TELEPHONE (OUTPATIENT)
Dept: GASTROENTEROLOGY | Facility: CLINIC | Age: 44
End: 2024-08-15
Payer: COMMERCIAL

## 2024-08-15 NOTE — TELEPHONE ENCOUNTER
LAST C/S   12/5/19  IN EPIC     PERSONAL HX OF POLYPS     FAMILY HX OF POLYPS     NO FAMILY HX OF COLON CA            NO  MEDICATIONS              OA QUESTIONNAIRE SCANNED IN MEDIA

## 2024-09-02 DIAGNOSIS — K63.5 POLYP OF COLON, UNSPECIFIED PART OF COLON, UNSPECIFIED TYPE: Primary | ICD-10-CM

## 2024-09-02 RX ORDER — SODIUM CHLORIDE, SODIUM LACTATE, POTASSIUM CHLORIDE, CALCIUM CHLORIDE 600; 310; 30; 20 MG/100ML; MG/100ML; MG/100ML; MG/100ML
30 INJECTION, SOLUTION INTRAVENOUS CONTINUOUS
OUTPATIENT
Start: 2024-09-02

## 2024-09-05 ENCOUNTER — TELEPHONE (OUTPATIENT)
Dept: GASTROENTEROLOGY | Facility: CLINIC | Age: 44
End: 2024-09-05
Payer: COMMERCIAL

## (undated) DEVICE — BITEBLOCK OMNI BLOC

## (undated) DEVICE — TUBING, SUCTION, 1/4" X 10', STRAIGHT: Brand: MEDLINE

## (undated) DEVICE — KT VLV BIOGUARD SXN BIOP AIR/H20 CONN 4PC DISP

## (undated) DEVICE — SNAR POLYP SENSATION STDOVL 27 240 BX40

## (undated) DEVICE — CANN NASL CO2 TRULINK W/O2 A/

## (undated) DEVICE — THE TORRENT IRRIGATION SCOPE CONNECTOR IS USED WITH THE TORRENT IRRIGATION TUBING TO PROVIDE IRRIGATION FLUIDS SUCH AS STERILE WATER DURING GASTROINTESTINAL ENDOSCOPIC PROCEDURES WHEN USED IN CONJUNCTION WITH AN IRRIGATION PUMP (OR ELECTROSURGICAL UNIT).: Brand: TORRENT

## (undated) DEVICE — SENSR O2 OXIMAX FNGR A/ 18IN NONSTR

## (undated) DEVICE — THE SINGLE USE ETRAP – POLYP TRAP IS USED FOR SUCTION RETRIEVAL OF ENDOSCOPICALLY REMOVED POLYPS.: Brand: ETRAP

## (undated) DEVICE — KT ORCA ORCAPOD DISP STRL

## (undated) DEVICE — CANN O2 ETCO2 FITS ALL CONN CO2 SMPL A/ 7IN DISP LF

## (undated) DEVICE — FRCP BX RADJAW4 NDL 2.8 240CM LG OG BX40

## (undated) DEVICE — LN SMPL CO2 SHTRM SD STREAM W/M LUER

## (undated) DEVICE — ADAPT CLN BIOGUARD AIR/H2O DISP